# Patient Record
Sex: FEMALE | Race: BLACK OR AFRICAN AMERICAN
[De-identification: names, ages, dates, MRNs, and addresses within clinical notes are randomized per-mention and may not be internally consistent; named-entity substitution may affect disease eponyms.]

---

## 2017-08-10 NOTE — DIAGNOSTIC IMAGING REPORT
Indication: Dyspnea



Comparison:  None



2 views of the chest obtained.



Findings: Cardiomediastinal silhouette and pulmonary vascularity are within normal

limits for age. The diaphragmatic contour is smooth and costophrenic angles are

sharp. No pleural effusions are identified. The bones are unremarkable.



Impression: No acute disease

## 2017-10-17 ENCOUNTER — HOSPITAL ENCOUNTER (OUTPATIENT)
Dept: HOSPITAL 72 - RAD | Age: 52
Discharge: HOME | End: 2017-10-17
Payer: COMMERCIAL

## 2017-10-17 DIAGNOSIS — R06.00: ICD-10-CM

## 2017-10-17 DIAGNOSIS — I10: ICD-10-CM

## 2017-10-17 DIAGNOSIS — Z01.818: Primary | ICD-10-CM

## 2017-10-17 PROCEDURE — 71020: CPT

## 2017-10-17 NOTE — DIAGNOSTIC IMAGING REPORT
Indication: Dyspnea



Comparison:  8/10/17



2 views of the chest obtained.



Findings:



The lung volumes are low. Cardiomediastinal silhouette and pulmonary vascularity 

are

within normal limits for age Alondra for the low volumes.. The diaphragmatic contour

is smooth and costophrenic angles are sharp. No pleural effusions are identified.

The bones are unremarkable.



Impression: No acute disease

## 2018-05-15 ENCOUNTER — HOSPITAL ENCOUNTER (EMERGENCY)
Dept: HOSPITAL 87 - ER | Age: 53
Discharge: HOME | End: 2018-05-15
Payer: COMMERCIAL

## 2018-05-15 VITALS — HEIGHT: 66 IN | WEIGHT: 200.62 LBS | BODY MASS INDEX: 32.24 KG/M2

## 2018-05-15 VITALS — DIASTOLIC BLOOD PRESSURE: 93 MMHG | SYSTOLIC BLOOD PRESSURE: 121 MMHG

## 2018-05-15 DIAGNOSIS — E78.00: ICD-10-CM

## 2018-05-15 DIAGNOSIS — K21.9: Primary | ICD-10-CM

## 2018-05-15 DIAGNOSIS — Z79.82: ICD-10-CM

## 2018-05-15 DIAGNOSIS — I10: ICD-10-CM

## 2018-05-15 LAB
APTT PPP: 29.3 SEC (ref 23.4–31)
BASOPHILS NFR BLD AUTO: 0.6 % (ref 0–2)
CHLORIDE SERPL-SCNC: 106 MEQ/L (ref 98–107)
EOSINOPHIL NFR BLD AUTO: 1 % (ref 0–5)
ERYTHROCYTE [DISTWIDTH] IN BLOOD BY AUTOMATED COUNT: 14.7 % (ref 11.6–14.6)
HCT VFR BLD AUTO: 34.7 % (ref 36–48)
HGB BLD-MCNC: 11.2 G/DL (ref 12–16)
INR PPP: 1.1
LYMPHOCYTES NFR BLD AUTO: 16.7 % (ref 20–50)
MCH RBC QN AUTO: 26.7 PG (ref 28–32)
MCV RBC AUTO: 82.8 FL (ref 81–99)
MONOCYTES NFR BLD AUTO: 5.2 % (ref 2–8)
NEUTROPHILS NFR BLD AUTO: 76.5 % (ref 40–76)
PLATELET # BLD AUTO: 305 X1000/UL (ref 130–400)
PMV BLD AUTO: 7.5 FL (ref 7.4–10.4)
PROTHROMBIN TIME: 11 SEC (ref 9.4–11.6)
RBC # BLD AUTO: 4.19 MILL/UL (ref 4.2–5.4)

## 2018-05-15 PROCEDURE — 36415 COLL VENOUS BLD VENIPUNCTURE: CPT

## 2018-05-15 PROCEDURE — 85025 COMPLETE CBC W/AUTO DIFF WBC: CPT

## 2018-05-15 PROCEDURE — 71045 X-RAY EXAM CHEST 1 VIEW: CPT

## 2018-05-15 PROCEDURE — 85610 PROTHROMBIN TIME: CPT

## 2018-05-15 PROCEDURE — 84484 ASSAY OF TROPONIN QUANT: CPT

## 2018-05-15 PROCEDURE — 80053 COMPREHEN METABOLIC PANEL: CPT

## 2018-05-15 PROCEDURE — 83690 ASSAY OF LIPASE: CPT

## 2018-05-15 PROCEDURE — 99285 EMERGENCY DEPT VISIT HI MDM: CPT

## 2018-05-15 PROCEDURE — 85730 THROMBOPLASTIN TIME PARTIAL: CPT

## 2018-05-15 PROCEDURE — 93005 ELECTROCARDIOGRAM TRACING: CPT

## 2019-06-25 ENCOUNTER — HOSPITAL ENCOUNTER (OUTPATIENT)
Dept: HOSPITAL 72 - RAD | Age: 54
Discharge: HOME | End: 2019-06-25
Payer: COMMERCIAL

## 2019-06-25 DIAGNOSIS — I10: Primary | ICD-10-CM

## 2019-06-25 DIAGNOSIS — R06.00: ICD-10-CM

## 2019-06-25 PROCEDURE — 71046 X-RAY EXAM CHEST 2 VIEWS: CPT

## 2019-06-25 NOTE — DIAGNOSTIC IMAGING REPORT
Indication: Dyspnea

 

Comparison:  10/17/2017

 

2 views of the chest obtained.

 

Findings:

 

Cardiomediastinal silhouette and pulmonary vascularity are within normal limits for

age. The diaphragmatic contour is smooth and costophrenic angles are sharp. No

pleural effusions are identified. The bones are unremarkable.

 

Impression: No acute disease

## 2019-11-20 ENCOUNTER — HOSPITAL ENCOUNTER (OUTPATIENT)
Dept: HOSPITAL 72 - RAD | Age: 54
Discharge: HOME | End: 2019-11-20
Payer: COMMERCIAL

## 2019-11-20 DIAGNOSIS — R05: Primary | ICD-10-CM

## 2019-11-20 PROCEDURE — 71046 X-RAY EXAM CHEST 2 VIEWS: CPT

## 2019-11-20 NOTE — DIAGNOSTIC IMAGING REPORT
Indication: Cough

 

Technique: 2 views of the chest

 

Comparison: 6/25/2019

 

Findings: Inspiration is somewhat less optimal than on the prior study. Lungs and

pleural spaces are clear. The heart size is normal.  There are mild degenerative

changes of the mid thoracic spine. No significant interim change.

 

Impression: Negative